# Patient Record
Sex: FEMALE | Race: WHITE | Employment: OTHER | URBAN - METROPOLITAN AREA
[De-identification: names, ages, dates, MRNs, and addresses within clinical notes are randomized per-mention and may not be internally consistent; named-entity substitution may affect disease eponyms.]

---

## 2020-11-05 ENCOUNTER — TRANSCRIBE ORDER (OUTPATIENT)
Dept: SCHEDULING | Age: 80
End: 2020-11-05

## 2020-11-05 DIAGNOSIS — M54.50 LOW BACK PAIN: Primary | ICD-10-CM

## 2020-11-30 ENCOUNTER — TRANSCRIBE ORDER (OUTPATIENT)
Dept: REGISTRATION | Age: 80
End: 2020-11-30

## 2020-11-30 ENCOUNTER — HOSPITAL ENCOUNTER (OUTPATIENT)
Dept: MRI IMAGING | Age: 80
Discharge: HOME | End: 2020-11-30
Attending: PAIN MEDICINE
Payer: MEDICARE

## 2020-11-30 ENCOUNTER — HOSPITAL ENCOUNTER (OUTPATIENT)
Dept: GENERAL RADIOLOGY | Age: 80
Discharge: HOME | End: 2020-11-30
Attending: PAIN MEDICINE
Payer: MEDICARE

## 2020-11-30 DIAGNOSIS — M54.50 LOW BACK PAIN: ICD-10-CM

## 2020-11-30 DIAGNOSIS — M54.9 BACK PAIN: Primary | ICD-10-CM

## 2020-11-30 DIAGNOSIS — M54.9 BACK PAIN: ICD-10-CM

## 2020-11-30 PROCEDURE — 72148 MRI LUMBAR SPINE W/O DYE: CPT

## 2020-11-30 PROCEDURE — 72100 X-RAY EXAM L-S SPINE 2/3 VWS: CPT

## 2021-07-21 ENCOUNTER — HOSPITAL ENCOUNTER (OUTPATIENT)
Dept: GENERAL RADIOLOGY | Age: 81
Discharge: HOME | End: 2021-07-21
Attending: PHYSICIAN ASSISTANT
Payer: MEDICARE

## 2021-07-21 ENCOUNTER — TRANSCRIBE ORDER (OUTPATIENT)
Dept: GENERAL RADIOLOGY | Age: 81
End: 2021-07-21

## 2021-07-21 DIAGNOSIS — M17.0 PRIMARY OSTEOARTHRITIS OF BOTH KNEES: ICD-10-CM

## 2021-07-21 DIAGNOSIS — M17.0 PRIMARY OSTEOARTHRITIS OF BOTH KNEES: Primary | ICD-10-CM

## 2021-07-21 PROCEDURE — 73565 X-RAY EXAM OF KNEES: CPT

## 2023-05-11 RX ORDER — IBUPROFEN 600 MG/1
TABLET ORAL EVERY 6 HOURS PRN
COMMUNITY
Start: 2011-12-31

## 2023-05-11 RX ORDER — DICLOFENAC SODIUM AND MISOPROSTOL 75; 200 MG/1; UG/1
1 TABLET, DELAYED RELEASE ORAL 2 TIMES DAILY
COMMUNITY

## 2023-05-11 RX ORDER — LORAZEPAM 1 MG/1
TABLET ORAL DAILY
COMMUNITY
Start: 2011-02-14

## 2023-05-11 RX ORDER — ATORVASTATIN CALCIUM 10 MG/1
TABLET, FILM COATED ORAL DAILY
COMMUNITY

## 2023-05-11 RX ORDER — FENOFIBRATE 145 MG/1
TABLET, COATED ORAL DAILY
COMMUNITY

## 2023-05-11 RX ORDER — LEVOTHYROXINE SODIUM 137 UG/1
TABLET ORAL
COMMUNITY

## 2023-05-11 RX ORDER — METOPROLOL SUCCINATE 50 MG/1
50 TABLET, EXTENDED RELEASE ORAL DAILY
COMMUNITY

## 2023-05-11 RX ORDER — TRAZODONE HYDROCHLORIDE 50 MG/1
25 TABLET ORAL NIGHTLY
COMMUNITY

## 2023-05-11 RX ORDER — NORETHINDRONE ACETATE AND ETHINYL ESTRADIOL 1; 5 MG/1; UG/1
TABLET ORAL
COMMUNITY

## 2023-05-11 RX ORDER — LOSARTAN POTASSIUM 100 MG/1
100 TABLET ORAL DAILY
COMMUNITY

## 2023-05-11 RX ORDER — ESOMEPRAZOLE MAGNESIUM 40 MG/1
40 CAPSULE, DELAYED RELEASE ORAL DAILY
COMMUNITY

## 2023-05-11 RX ORDER — GUAIFENESIN AND PSEUDOEPHEDRINE HCL 1200; 120 MG/1; MG/1
1 TABLET, EXTENDED RELEASE ORAL EVERY 12 HOURS
COMMUNITY
Start: 2011-12-31

## 2023-12-14 ENCOUNTER — TRANSCRIBE ORDERS (OUTPATIENT)
Facility: HOSPITAL | Age: 83
End: 2023-12-14

## 2023-12-14 DIAGNOSIS — G62.9 PERIPHERAL NERVE DISORDER: Primary | ICD-10-CM

## 2024-01-08 ENCOUNTER — PROCEDURE VISIT (OUTPATIENT)
Age: 84
End: 2024-01-08
Payer: MEDICARE

## 2024-01-08 DIAGNOSIS — G62.9 POLYNEUROPATHY, UNSPECIFIED: Primary | ICD-10-CM

## 2024-01-08 PROCEDURE — 95886 MUSC TEST DONE W/N TEST COMP: CPT | Performed by: PSYCHIATRY & NEUROLOGY

## 2024-01-08 PROCEDURE — 95910 NRV CNDJ TEST 7-8 STUDIES: CPT | Performed by: PSYCHIATRY & NEUROLOGY

## 2024-01-08 NOTE — PROGRESS NOTES
MD Lemuel        Nerve Conduction Studies  Anti Sensory Summary Table     Stim Site NR Peak (ms) Norm Peak (ms) P-T Amp (µV) Norm P-T Amp Onset (ms) Site1 Site2 Delta-P (ms) Dist (cm) Marcos (m/s) Norm Marcos (m/s)   Left Sup Peroneal Anti Sensory (Ant Lat Mall)  31 °C   14 cm NR  <4.4  >5.0  14 cm Ant Lat Mall  14.0  >32   Right Sup Peroneal Anti Sensory (Ant Lat Mall)  31 °C   14 cm NR  <4.4  >5.0  14 cm Ant Lat Mall  14.0  >32   Left Sural Anti Sensory (Lat Mall)  30.4 °C   Calf    3.3 <4.0 13.6 >5.0 2.9 Calf Lat Mall 3.3 14.0 42 >35   Site 2    3.3  9.3  2.9         Right Sural Anti Sensory (Lat Mall)  31 °C   Calf    3.5 <4.0 9.7 >5.0 2.9 Calf Lat Mall 3.5 14.0 40 >35     Motor Summary Table     Stim Site NR Onset (ms) Norm Onset (ms) O-P Amp (mV) Norm O-P Amp Site1 Site2 Delta-0 (ms) Dist (cm) Marcos (m/s) Norm Marcos (m/s)   Left Peroneal Motor (Ext Dig Brev)  30.8 °C   Ankle    3.9 <6.1 2.8 >2.5 B Fib Ankle 7.9 35.0 44 >38   B Fib    11.8  2.4  Poplt B Fib 2.0 10.0 50 >40   Poplt    13.8  2.2          Right Peroneal Motor (Ext Dig Brev)  31 °C   Ankle    4.4 <6.1 1.7 >2.5 B Fib Ankle 8.1 35.0 43 >38   B Fib    12.5  1.3  Poplt B Fib 1.5 10.0 67 >40   Poplt    14.0  1.2          Left Tibial Motor (Abd Brice Brev)  30.8 °C   Ankle    5.8 <6.1 4.4 >3.0 Knee Ankle 8.9 41.0 46 >35   Knee    14.7  3.6          Right Tibial Motor (Abd Brice Brev)  31 °C   Ankle    4.2 <6.1 2.2 >3.0 Knee Ankle 10.3 41.0 40 >35   Knee    14.5  2.8            F Wave Studies     NR F-Lat (ms) Lat Norm (ms) L-R F-Lat (ms) L-R Lat Norm   Left Tibial (Mrkrs) (Abd Hallucis)  30.8 °C      55.00 <61 1.47 <5.7   Right Tibial (Mrkrs) (Abd Hallucis)  31 °C      56.47 <61 1.47 <5.7     EMG     Side Muscle Nerve Root Ins Act Fibs Psw Amp Dur Poly Recrt Int Pat Comment   Left AntTibialis Dp Br Peronel L4-5 Nml Nml Nml Nml Nml 0 Nml Nml    Left Peroneus Long Sup Br Peronel L5-S1 Nml Nml Nml Nml Nml 0 Nml Nml    Left Gastroc Tibial S1-2 Nml Nml Nml Nml Nml 0 Nml

## 2024-08-10 ENCOUNTER — HOSPITAL ENCOUNTER (EMERGENCY)
Facility: HOSPITAL | Age: 84
Discharge: HOME | End: 2024-08-10
Attending: STUDENT IN AN ORGANIZED HEALTH CARE EDUCATION/TRAINING PROGRAM
Payer: MEDICARE

## 2024-08-10 ENCOUNTER — APPOINTMENT (OUTPATIENT)
Facility: HOSPITAL | Age: 84
End: 2024-08-10
Payer: MEDICARE

## 2024-08-10 VITALS
HEIGHT: 60 IN | RESPIRATION RATE: 25 BRPM | DIASTOLIC BLOOD PRESSURE: 119 MMHG | OXYGEN SATURATION: 98 % | BODY MASS INDEX: 21.79 KG/M2 | WEIGHT: 111 LBS | TEMPERATURE: 98 F | SYSTOLIC BLOOD PRESSURE: 149 MMHG | HEART RATE: 77 BPM

## 2024-08-10 DIAGNOSIS — R51.9 ACUTE NONINTRACTABLE HEADACHE, UNSPECIFIED HEADACHE TYPE: Primary | ICD-10-CM

## 2024-08-10 PROCEDURE — 99284 EMERGENCY DEPT VISIT MOD MDM: CPT

## 2024-08-10 PROCEDURE — 70450 CT HEAD/BRAIN W/O DYE: CPT

## 2024-08-10 ASSESSMENT — PAIN DESCRIPTION - PAIN TYPE: TYPE: ACUTE PAIN

## 2024-08-10 ASSESSMENT — PAIN DESCRIPTION - FREQUENCY: FREQUENCY: INTERMITTENT

## 2024-08-10 ASSESSMENT — ENCOUNTER SYMPTOMS: SHORTNESS OF BREATH: 0

## 2024-08-10 ASSESSMENT — PAIN - FUNCTIONAL ASSESSMENT
PAIN_FUNCTIONAL_ASSESSMENT: ACTIVITIES ARE NOT PREVENTED
PAIN_FUNCTIONAL_ASSESSMENT: 0-10

## 2024-08-10 ASSESSMENT — PAIN DESCRIPTION - DESCRIPTORS: DESCRIPTORS: SHOOTING

## 2024-08-10 ASSESSMENT — PAIN DESCRIPTION - ORIENTATION: ORIENTATION: POSTERIOR

## 2024-08-10 ASSESSMENT — PAIN DESCRIPTION - LOCATION: LOCATION: HEAD

## 2024-08-10 ASSESSMENT — PAIN DESCRIPTION - ONSET: ONSET: ON-GOING

## 2024-08-10 ASSESSMENT — PAIN SCALES - GENERAL: PAINLEVEL_OUTOF10: 6

## 2024-08-11 NOTE — ED PROVIDER NOTES
and independent interpretation performed.     Details: No bleed on my read of head CT            REASSESSMENT          CONSULTS:  None    PROCEDURES:  Unless otherwise noted below, none     Procedures    DISCHARGE NOTE:  11:21 PM  The patient has been re-evaluated and feeling much better and are stable for discharge.  All available radiology and laboratory results have been reviewed with patient and/or available family.  Patient and/or family verbally conveyed their understanding and agreement of the patient's signs, symptoms, diagnosis, treatment and prognosis and additionally agree to follow-up as recommended in the discharge instructions or to return to the Emergency Department should their condition change or worsen prior to their follow-up appointment.  All questions have been answered and patient and/or available family express understanding.      LABORATORY RESULTS:  Labs Reviewed - No data to display    All other labs were within normal range or not returned as of this dictation.    IMAGING RESULTS:  CT Head W/O Contrast   Final Result   1. No evidence of acute infarct or intracranial hemorrhage.   2. 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         Electronically signed by Sher Jolly MD           MEDICATIONS GIVEN:  Medications - No data to display    IMPRESSION:  1. Acute nonintractable headache, unspecified headache type        PLAN:  DISPOSITION Decision To Discharge 08/10/2024 11:20:55 PM      PATIENT REFERRED TO:  Merari Fletcher MD  [address]   [facility] [id number] [phone]    In 3 days      [facility] EMERGENCY DEP  [address] [place] [id number] [phone]    As needed, If symptoms worsen      DISCHARGE MEDICATIONS:  New Prescriptions    No medications on file       Signed By: Cosme Azevedo MD     August 10, 2024        (Please note that portions of this note were completed with a voice recognition program.  [employer]

## 2025-04-20 NOTE — ED TRIAGE NOTES
Pt co headache x2 hours. States pain is in the posterior aspect of her head. Pt states having hx of headaches, but pain is different. Describes as piercing and intermittent.     Denies blurred vision, light sensitivity. Denies numbness or tingling.     Pt stats having imaging done at an [facility] hospital with some evidence of CVA within the past year.    NIH 0 in triage, MD in room    Principal Discharge DX:	Nausea, vomiting, and diarrhea   1